# Patient Record
(demographics unavailable — no encounter records)

---

## 2025-05-19 NOTE — ASSESSMENT
[FreeTextEntry1] : 76 year  old woman  with sleep apnea is doing well with the CPAP.  Patient is compliant with the CPAP and benefited significantly with the CPAP.

## 2025-05-19 NOTE — HISTORY OF PRESENT ILLNESS
[Date: ___] : Date of most recent diagnostic polysomnogram: [unfilled] [AHI: ___ per hour] : Apnea-hypopnea index:  [unfilled] per hour [FreeTextEntry1] : Dr. Biju Varghese  76 -year-old woman with history of hypertension, dyslipidemia, hypothyroidism, jamal is here for management of sleep apnea.   symptoms prior to cpap - Patient continues to have loud snoring at night and witnessed apneas.  Sometimes she complains of morning headaches and  tiredness during the daytime.   Natoma sleepiness score is normal.   she tried theravent which did not work for snoring, used oral appliance and failed.  Patient does not snore with cpap.   Natoma sleepiness score is normal.    usage 5 hrs overall usage 40%, asked her to increase the usage ahi 1 settings 5-20 cm uses fullface mask dme apria